# Patient Record
Sex: MALE | Race: WHITE | NOT HISPANIC OR LATINO
[De-identification: names, ages, dates, MRNs, and addresses within clinical notes are randomized per-mention and may not be internally consistent; named-entity substitution may affect disease eponyms.]

---

## 2017-06-12 ENCOUNTER — APPOINTMENT (OUTPATIENT)
Dept: NEUROLOGY | Facility: CLINIC | Age: 27
End: 2017-06-12

## 2017-06-12 VITALS
SYSTOLIC BLOOD PRESSURE: 107 MMHG | WEIGHT: 130 LBS | BODY MASS INDEX: 23.92 KG/M2 | HEART RATE: 67 BPM | DIASTOLIC BLOOD PRESSURE: 69 MMHG | HEIGHT: 62 IN

## 2017-06-12 RX ORDER — CLOTRIMAZOLE AND BETAMETHASONE DIPROPIONATE 10; .5 MG/G; MG/G
1-0.05 CREAM TOPICAL
Qty: 15 | Refills: 0 | Status: ACTIVE | COMMUNITY
Start: 2017-05-23

## 2018-04-14 ENCOUNTER — EMERGENCY (EMERGENCY)
Facility: HOSPITAL | Age: 28
LOS: 1 days | Discharge: HOME | End: 2018-04-14
Attending: PERSONAL EMERGENCY RESPONSE ATTENDANT
Payer: MEDICARE

## 2018-04-14 VITALS
SYSTOLIC BLOOD PRESSURE: 141 MMHG | HEART RATE: 110 BPM | RESPIRATION RATE: 20 BRPM | OXYGEN SATURATION: 98 % | TEMPERATURE: 98 F | DIASTOLIC BLOOD PRESSURE: 93 MMHG

## 2018-04-14 VITALS
DIASTOLIC BLOOD PRESSURE: 86 MMHG | OXYGEN SATURATION: 100 % | RESPIRATION RATE: 16 BRPM | HEART RATE: 100 BPM | SYSTOLIC BLOOD PRESSURE: 131 MMHG

## 2018-04-14 LAB
ALBUMIN SERPL ELPH-MCNC: 4.5 G/DL — SIGNIFICANT CHANGE UP (ref 3.3–5)
ALP SERPL-CCNC: 57 U/L — SIGNIFICANT CHANGE UP (ref 40–120)
ALT FLD-CCNC: 17 U/L RC — SIGNIFICANT CHANGE UP (ref 10–45)
ANION GAP SERPL CALC-SCNC: 15 MMOL/L — SIGNIFICANT CHANGE UP (ref 5–17)
AST SERPL-CCNC: 20 U/L — SIGNIFICANT CHANGE UP (ref 10–40)
BASOPHILS # BLD AUTO: 0.1 K/UL — SIGNIFICANT CHANGE UP (ref 0–0.2)
BASOPHILS NFR BLD AUTO: 1.2 % — SIGNIFICANT CHANGE UP (ref 0–2)
BILIRUB SERPL-MCNC: 0.2 MG/DL — SIGNIFICANT CHANGE UP (ref 0.2–1.2)
BUN SERPL-MCNC: 21 MG/DL — SIGNIFICANT CHANGE UP (ref 7–23)
CALCIUM SERPL-MCNC: 9.7 MG/DL — SIGNIFICANT CHANGE UP (ref 8.4–10.5)
CHLORIDE SERPL-SCNC: 104 MMOL/L — SIGNIFICANT CHANGE UP (ref 96–108)
CO2 SERPL-SCNC: 19 MMOL/L — LOW (ref 22–31)
CREAT SERPL-MCNC: 0.72 MG/DL — SIGNIFICANT CHANGE UP (ref 0.5–1.3)
EOSINOPHIL # BLD AUTO: 0.3 K/UL — SIGNIFICANT CHANGE UP (ref 0–0.5)
EOSINOPHIL NFR BLD AUTO: 3.6 % — SIGNIFICANT CHANGE UP (ref 0–6)
GLUCOSE SERPL-MCNC: 102 MG/DL — HIGH (ref 70–99)
HCT VFR BLD CALC: 41.5 % — SIGNIFICANT CHANGE UP (ref 39–50)
HGB BLD-MCNC: 14.3 G/DL — SIGNIFICANT CHANGE UP (ref 13–17)
LYMPHOCYTES # BLD AUTO: 3 K/UL — SIGNIFICANT CHANGE UP (ref 1–3.3)
LYMPHOCYTES # BLD AUTO: 40 % — SIGNIFICANT CHANGE UP (ref 13–44)
MCHC RBC-ENTMCNC: 32.2 PG — SIGNIFICANT CHANGE UP (ref 27–34)
MCHC RBC-ENTMCNC: 34.5 GM/DL — SIGNIFICANT CHANGE UP (ref 32–36)
MCV RBC AUTO: 93.3 FL — SIGNIFICANT CHANGE UP (ref 80–100)
MONOCYTES # BLD AUTO: 1 K/UL — HIGH (ref 0–0.9)
MONOCYTES NFR BLD AUTO: 13.9 % — SIGNIFICANT CHANGE UP (ref 2–14)
NEUTROPHILS # BLD AUTO: 3.1 K/UL — SIGNIFICANT CHANGE UP (ref 1.8–7.4)
NEUTROPHILS NFR BLD AUTO: 41.3 % — LOW (ref 43–77)
PLATELET # BLD AUTO: 174 K/UL — SIGNIFICANT CHANGE UP (ref 150–400)
POTASSIUM SERPL-MCNC: 5.1 MMOL/L — SIGNIFICANT CHANGE UP (ref 3.5–5.3)
POTASSIUM SERPL-SCNC: 5.1 MMOL/L — SIGNIFICANT CHANGE UP (ref 3.5–5.3)
PROT SERPL-MCNC: 7.9 G/DL — SIGNIFICANT CHANGE UP (ref 6–8.3)
RBC # BLD: 4.45 M/UL — SIGNIFICANT CHANGE UP (ref 4.2–5.8)
RBC # FLD: 11.5 % — SIGNIFICANT CHANGE UP (ref 10.3–14.5)
SODIUM SERPL-SCNC: 138 MMOL/L — SIGNIFICANT CHANGE UP (ref 135–145)
VALPROATE SERPL-MCNC: 85 UG/ML — SIGNIFICANT CHANGE UP (ref 50–100)
WBC # BLD: 7.5 K/UL — SIGNIFICANT CHANGE UP (ref 3.8–10.5)
WBC # FLD AUTO: 7.5 K/UL — SIGNIFICANT CHANGE UP (ref 3.8–10.5)

## 2018-04-14 PROCEDURE — 99284 EMERGENCY DEPT VISIT MOD MDM: CPT | Mod: GC

## 2018-04-14 PROCEDURE — 70450 CT HEAD/BRAIN W/O DYE: CPT | Mod: 26

## 2018-04-14 PROCEDURE — 80053 COMPREHEN METABOLIC PANEL: CPT

## 2018-04-14 PROCEDURE — 85652 RBC SED RATE AUTOMATED: CPT

## 2018-04-14 PROCEDURE — 80164 ASSAY DIPROPYLACETIC ACD TOT: CPT

## 2018-04-14 PROCEDURE — 70450 CT HEAD/BRAIN W/O DYE: CPT

## 2018-04-14 PROCEDURE — 99284 EMERGENCY DEPT VISIT MOD MDM: CPT

## 2018-04-14 PROCEDURE — 85027 COMPLETE CBC AUTOMATED: CPT

## 2018-04-14 NOTE — ED ADULT NURSE NOTE - OBJECTIVE STATEMENT
27y male with hx of anxiety, seizure disorder, and retinopathy prematurity presents to the ER c/o right blurry vision. pt is alert and oriented x 4 and speaking coherently. pt states yesterday his vision became blurry in the afternoon, he follow up with his retina specialist and was d/c home with no significant findings. pt woke up tonight with worsening of the blurry vision. 27y male with hx of anxiety, seizure disorder, and retinopathy prematurity presents to the ER c/o right blurry vision. pt is alert and oriented x 4 and speaking coherently. pt states yesterday his vision became blurry in the afternoon, he follow up with his retina specialist and was d/c home with no significant findings. pt woke up tonight with worsening of the blurry vision. pt denies cp, sob, n/v/d, fevers, chills. pt resting comfortably in bed. pending md espinoza, pt moving all extremities. will reassess.

## 2018-04-14 NOTE — ED PROVIDER NOTE - EYES, MLM
conjunctival injection in right eye, extra ocular movement intact, right visual field of right eye is not detected

## 2018-04-14 NOTE — CONSULT NOTE ADULT - SUBJECTIVE AND OBJECTIVE BOX
[facility] Ophthalmology Consult Note    HPI: 26 yo male presenting with 1 day hx of blurry vision and ?Visual field loss. Patient has history of premature retinopathy.  left eye has slight peripheral vision since birth.  Right eye unknown baseline, however patient and parents say it is normal, patient can generally read without difficulty..  Patient started complaining of acute onset blurriness friday morning.  Saw retina specialist Dr. Pedraza at [facility], was told that everything was unremarkable.  When patient woke up this morning, he couldn't see anything from his right eye.  states that he sees floaters. had cryo surgery at 2 months old.      PMH: Seizures, Intellectual disability, Abdominal sx, ear sx.   Meds: Per chart.   POcHx (including surgeries/lasers/trauma):  Cryotherapy OU for ROP at 2 months of age. Strabismus surgery OU approximately 20 years ago.  Drops: None  FamHx: None  Social Hx: None  Allergies: NKDA    ROS:  General (neg), Vision (per HPI), Head and Neck (neg), Pulm (neg), CV (neg), GI (neg),  (neg), Musculoskeletal (neg), Skin/Integ (neg), Neuro (neg), Endocrine (neg), Heme (neg), All/Immuno (neg)    Mood and Affect Appropriate ( x ),  Oriented to Time, Place, and Person x 3 ( x )    Ophthalmology Exam    Visual acuity (CC) 20/400, 20/800.  Pupils: R+R OU, no APD  Ttono: 9,11   Extraocular movements (EOMs): Full OU, no pain, no diplopia  Confrontational Visual Field (CVF):  Difficult to assess 2/2 to patient cooperativity. Appears nasal defect OD, Full OS  Color Plates: EARL 2/2 to poor vision OU    Slit Lamp Exam (SLE)  Horizontal nystagmus OS>OD.   External:  Flat OU  Lids/Lashes/Lacrimal Ducts: Flat OU    Sclera/Conjunctiva: Trace injection OD. W+Q OS  Cornea: Cl OU  Anterior Chamber: D+Q OU   Iris:  Flat OU  Lens:  Cl OU    Fundus Exam: dilated with 1% tropicamide and 2.5% phenylephrine  Approval obtained from primary team for dilation  Patient aware that pupils can remained dilated for at least 4-6 hours  Exam performed with 20D lens    Vitreous: wnl OU  Disc, cup/disc: sharp and pink, 0.4 OU  Macula:  wnl OU  Vessels:  wnl OU  Periphery: wnl OU    Diagnostic Testing:  CT Brain    No acute intracranial hemorrhage, mass effect, or CT evidence of an acute   vascular territorial infarct.       A/P: Pending Dilated Fundus exam.     Follow-Up:  Patient should follow up his/her ophthalmologist or in the [facility] Ophthalmology Practice within 1 week of discharge  [address]  [place] [address]  [phone] [facility] Ophthalmology Consult Note    HPI: 28 yo male presenting with 1 day hx of blurry vision and ?Visual field loss. Patient has history of premature retinopathy.  left eye has slight peripheral vision since birth.  Right eye unknown baseline, however patient and parents say it is normal, patient can generally read without difficulty..  Patient started complaining of acute onset blurriness friday morning.  Saw retina specialist Dr. Pedraza at [facility] [place], was told that everything was unremarkable.  When patient woke up this morning, he couldn't see anything from his right eye.  states that he sees floaters. had cryo surgery at 2 months old.      PMH: Seizures, Intellectual disability, Abdominal sx, ear sx.   Meds: Per chart.   POcHx (including surgeries/lasers/trauma):  Cryotherapy OU for ROP at 2 months of age. Strabismus surgery OU approximately 20 years ago.  Drops: None  FamHx: None  Social Hx: None  Allergies: NKDA    ROS:  General (neg), Vision (per HPI), Head and Neck (neg), Pulm (neg), CV (neg), GI (neg),  (neg), Musculoskeletal (neg), Skin/Integ (neg), Neuro (neg), Endocrine (neg), Heme (neg), All/Immuno (neg)    Mood and Affect Appropriate ( x ),  Oriented to Time, Place, and Person x 3 ( x )    Ophthalmology Exam    Visual acuity (CC) 20/400, 20/800.  Pupils: R+R OU, no APD  Ttono: 9,11   Extraocular movements (EOMs): Full OU, no pain, no diplopia  Confrontational Visual Field (CVF):  Difficult to assess 2/2 to patient cooperativity. Appears nasal defect OD, Full OS  Color Plates: EARL 2/2 to poor vision OU    Slit Lamp Exam (SLE) (Very limited exam due to patient cooperativity)  Horizontal nystagmus OS>OD.   External:  Flat OU  Lids/Lashes/Lacrimal Ducts: Flat OU    Sclera/Conjunctiva: Trace injection OD. W+Q OS  Cornea: Diffuse SPK OU  Anterior Chamber: D+F, unable to assess if pigment in A/C due to patient cooperativity OU   Iris:  Flat OU  Lens:  Cl OU    Fundus Exam: dilated with 1% tropicamide and 2.5% phenylephrine  Approval obtained from primary team for dilation  Patient aware that pupils can remained dilated for at least 4-6 hours  Exam performed with 20D lens    Vitreous: No view 2/2 to patient cooperativity.  OU  Disc, cup/disc: No view 2/2 to patient cooperativity. OU   Macula:  No view 2/2 to patient cooperativity. OU  Vessels:  No view 2/2 to patient cooperativity. OU  Periphery: No view 2/2 to patient cooperativity.  OU    Diagnostic Testing:  B-Scan: Linear Hyperechoic density in posterior 1/3 of globe, likely detached retina OD. Retina attached, no vitreous hemorrhage OS.     CT Brain  No acute intracranial hemorrhage, mass effect, or CT evidence of an acute   vascular territorial infarct.       A/P: 26 y/o m with history of ROP with Cryotherapy. Acute vision deterioration in right eye from unknown baseline for last 2 days. Difficulty exam due to patient cooperativity, however B-Scan shows evidence or right retinal detachment. Discussed with Retina Attending Dr. Hi. Patient to be re-evaluated at [facility] Monday morning at 08:30, possible plan for Retina reattachment surgery early next week. Patient's family advised to schedule appointment with Primary care provider for Monday early afternoon for possible need for Pre-op clearance evaluation   -No acute Ophthalmologic intervention warrented.         Follow-Up:  Patient should follow up at [facility] Monday morning at 08:30  [address]  [place] [address]  [phone]

## 2018-04-14 NOTE — ED PROVIDER NOTE - OBJECTIVE STATEMENT
28 yo male presenting with 1 day hx of blurry vision.  hx of premature retinopathy.  left eye has slight peripheral vision since birth.  right eye was unremarkable.  patient started complaining of acute onset blurriness friday morning.  saw retina specialist and was told that everything was unremarkable.  when patient woke up this morning, he couldn't see anything from his right eye.  states that he sees floaters. had cryo surgery at 2 months old.    pcp- [facility]  ret[facility]  [facility]

## 2018-04-14 NOTE — ED ADULT NURSE REASSESSMENT NOTE - NS ED NURSE REASSESS COMMENT FT1
Received patient in room 11. Patient states he can barely see through his right eye. Right eye noted to be reddened, patient denies any pain at this time and reports occasional itching. Head CT completed, awaiting optho, family at bedside.

## 2018-04-14 NOTE — ED PROVIDER NOTE - PROGRESS NOTE DETAILS
paged ophtho again Attending MD [place].  Optho reached.  Optho to see in ED and possibly discharge to optho clinic for retinal specialist.  Pt with R lateral upper and lower field of view loss, medial fields of view intact.  Concern for retinal detachment given painless visual loss in R lateral fields of view with floaters.  Optho aware of these concerns.  Pt signed out to incoming team pending optho eval in stable condition. As per ophtho resident Kristian Lala, Ocular US and exam suggests Mac off retinal detachment.  Dr. Lala discussed case with patient's retinal specialist Dr. Hi's group.  Plan for eval at retinal center in less than 48 hours.  Monday 4/16 at 830AM.      KEVIN Esposito MD As per Dr. Lala, change of plan as family would prefer evaluation today.  patient is to be discharged and will proceed directly to retinal clinic to see Dr. Hi in person.      KEVIN Esposito MD

## 2018-04-16 ENCOUNTER — TRANSCRIPTION ENCOUNTER (OUTPATIENT)
Age: 28
End: 2018-04-16

## 2018-04-16 ENCOUNTER — OTHER (OUTPATIENT)
Age: 28
End: 2018-04-16

## 2018-04-16 NOTE — ASU PATIENT PROFILE, ADULT - TEACHING/LEARNING FACTORS IMPACT ABILITY TO LEARN
learning disabilities/visual problems/communication limitations/anxiety/cognitive limitations/comprehension

## 2018-04-16 NOTE — ASU PATIENT PROFILE, ADULT - PMH
ADD (attention deficit disorder)    Anxiety    Depression    Legally blind    OCD (obsessive compulsive disorder)    Scoliosis    Seizure

## 2018-04-16 NOTE — ASU PATIENT PROFILE, ADULT - VISION (WITH CORRECTIVE LENSES IF THE PATIENT USUALLY WEARS THEM):
Left eye blind and right eye blurry/Partially impaired: cannot see medication labels or newsprint, but can see obstacles in path, and the surrounding layout; can count fingers at arm's length

## 2018-04-17 ENCOUNTER — OUTPATIENT (OUTPATIENT)
Dept: OUTPATIENT SERVICES | Facility: HOSPITAL | Age: 28
LOS: 1 days | End: 2018-04-17
Payer: MEDICARE

## 2018-04-17 VITALS
HEART RATE: 79 BPM | HEIGHT: 65 IN | OXYGEN SATURATION: 98 % | TEMPERATURE: 98 F | WEIGHT: 113.32 LBS | SYSTOLIC BLOOD PRESSURE: 136 MMHG | RESPIRATION RATE: 14 BRPM | DIASTOLIC BLOOD PRESSURE: 72 MMHG

## 2018-04-17 VITALS
OXYGEN SATURATION: 98 % | HEART RATE: 100 BPM | RESPIRATION RATE: 20 BRPM | SYSTOLIC BLOOD PRESSURE: 135 MMHG | DIASTOLIC BLOOD PRESSURE: 73 MMHG

## 2018-04-17 DIAGNOSIS — H33.021 RETINAL DETACHMENT WITH MULTIPLE BREAKS, RIGHT EYE: ICD-10-CM

## 2018-04-17 DIAGNOSIS — Z86.69 PERSONAL HISTORY OF OTHER DISEASES OF THE NERVOUS SYSTEM AND SENSE ORGANS: Chronic | ICD-10-CM

## 2018-04-17 DIAGNOSIS — Z98.890 OTHER SPECIFIED POSTPROCEDURAL STATES: Chronic | ICD-10-CM

## 2018-04-17 PROCEDURE — 67108 REPAIR DETACHED RETINA: CPT | Mod: RT

## 2018-04-17 PROCEDURE — C1889: CPT

## 2018-04-17 NOTE — ASU DISCHARGE PLAN (ADULT/PEDIATRIC). - NOTIFY
Pain not relieved by Medications/Bleeding that does not stop/Swelling that continues/Fever greater than 101/Persistent Nausea and Vomiting Swelling that continues/Pain not relieved by Medications/Increased Irritability or Sluggishness/Persistent Nausea and Vomiting/Inability to Tolerate Liquids or Foods/Bleeding that does not stop/Fever greater than 101

## 2018-04-17 NOTE — ASU DISCHARGE PLAN (ADULT/PEDIATRIC). - PT EDUC
Implant card (specify)/other (specify)/Gas bubble card, Eye shield instructions and eye kit given to patient

## 2018-06-04 ENCOUNTER — APPOINTMENT (OUTPATIENT)
Dept: NEUROLOGY | Facility: CLINIC | Age: 28
End: 2018-06-04
Payer: MEDICARE

## 2018-06-04 VITALS
BODY MASS INDEX: 20.14 KG/M2 | HEART RATE: 76 BPM | WEIGHT: 118 LBS | DIASTOLIC BLOOD PRESSURE: 71 MMHG | SYSTOLIC BLOOD PRESSURE: 112 MMHG | HEIGHT: 64 IN

## 2018-06-04 PROBLEM — M41.9 SCOLIOSIS, UNSPECIFIED: Chronic | Status: ACTIVE | Noted: 2018-04-17

## 2018-06-04 PROBLEM — F42.9 OBSESSIVE-COMPULSIVE DISORDER, UNSPECIFIED: Chronic | Status: ACTIVE | Noted: 2018-04-17

## 2018-06-04 PROBLEM — F98.8 OTHER SPECIFIED BEHAVIORAL AND EMOTIONAL DISORDERS WITH ONSET USUALLY OCCURRING IN CHILDHOOD AND ADOLESCENCE: Chronic | Status: ACTIVE | Noted: 2018-04-17

## 2018-06-04 PROBLEM — H54.8 LEGAL BLINDNESS, AS DEFINED IN USA: Chronic | Status: ACTIVE | Noted: 2018-04-17

## 2018-06-04 PROBLEM — F32.9 MAJOR DEPRESSIVE DISORDER, SINGLE EPISODE, UNSPECIFIED: Chronic | Status: ACTIVE | Noted: 2018-04-17

## 2018-06-04 PROBLEM — F41.9 ANXIETY DISORDER, UNSPECIFIED: Chronic | Status: ACTIVE | Noted: 2018-04-17

## 2018-06-04 PROBLEM — R56.9 UNSPECIFIED CONVULSIONS: Chronic | Status: ACTIVE | Noted: 2018-04-17

## 2018-06-04 PROCEDURE — 99214 OFFICE O/P EST MOD 30 MIN: CPT

## 2018-06-04 RX ORDER — SERTRALINE HYDROCHLORIDE 100 MG/1
100 TABLET, FILM COATED ORAL
Qty: 60 | Refills: 0 | Status: ACTIVE | COMMUNITY
Start: 2018-06-04

## 2019-06-03 ENCOUNTER — APPOINTMENT (OUTPATIENT)
Dept: NEUROLOGY | Facility: CLINIC | Age: 29
End: 2019-06-03

## 2019-06-12 ENCOUNTER — APPOINTMENT (OUTPATIENT)
Dept: NEUROLOGY | Facility: CLINIC | Age: 29
End: 2019-06-12
Payer: MEDICARE

## 2019-06-12 VITALS
SYSTOLIC BLOOD PRESSURE: 109 MMHG | BODY MASS INDEX: 21.68 KG/M2 | HEART RATE: 71 BPM | HEIGHT: 64 IN | WEIGHT: 127 LBS | DIASTOLIC BLOOD PRESSURE: 69 MMHG

## 2019-06-12 PROCEDURE — 99214 OFFICE O/P EST MOD 30 MIN: CPT

## 2019-06-12 NOTE — DATA REVIEWED
[No studies available for review at this time.] : No studies available for review at this time. [FreeTextEntry1] : Recent labs normal, with VPA level of 41

## 2019-06-12 NOTE — HISTORY OF PRESENT ILLNESS
[FreeTextEntry1] : **UPDATE:6/12/19***\par Patient accompanied by father\par doing well with no reported seizures\par Zoloft is helping for anxiety\par mood good\par \par \par Divalproex 750mg daily (prescribed by psychiatry)\par \par \par The patient is a 28 year old right handed man with history as below.  He has had no seizures since last visit.  He states his mood is good.  He is on Depakote 750mg ER daily with last increase by Dr. White for irritable behavior, and it has helped. He is in psychotherapy.  \par \par He has had no hospitalizations, ER visits, surgeries or injuries since last visit.  He has no complaints on complete ROS.  He is sleeping quite a bit but stays up very late, eating well, and mood is good.  He reports no side effects from meds.  HE reports no new illnesses in the family.  \par \par Past history: The patient had the onset  of seizures shortly after a camping trip in February 2004 and one on May 16th, 2004 and started on Depakote at that time.  The EEGs have been abnormal with interictal epileptiform patterns. He originally followed with Dr. Schaffer and then with Dr. Rosenberg.\par \par He also has depression, anxiety, ADHD.  He has developmental disability and has always been in special courses. \par   \par He was born one of triplets at 26 weeks, 2 pounds 10 Oz at [facility].  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  Continues to do well at this time.  One short seizure in June 2016 with sleep deprivation.\par \par Now, had retinal detachment last month that was repaired and increased anxiety.  Increased to Zoloft 100mg bid.  Still on Depakote (brand name).\par \par No change PMHx, SocHx, FHx\par \par

## 2019-06-12 NOTE — LETTER CLOSING
[Sincerely,] : Sincerely, [Chief, Division of Epilepsy and Electroencephalography] : Chief, Division of Epilepsy and Electroencephalography [Tea Rosenberg MD] : Tea Rosenberg MD [[facility]] : [facility] [[facility]] : [facility] [Professor of Neurology] : Professor of Neurology [[facility]] : [facility]

## 2019-06-12 NOTE — PHYSICAL EXAM
[General Appearance - Alert] : alert [General Appearance - In No Acute Distress] : in no acute distress [Oriented To Time, Place, And Person] : oriented to person, place, and time [Impaired Insight] : insight and judgment were intact [Affect] : the affect was normal [Place] : oriented to place [Time] : oriented to time [Person] : oriented to person [Cranial Nerves Optic (II)] : visual acuity intact bilaterally,  visual fields full to confrontation, pupils equal round and reactive to light [Fluency] : fluency intact [Cranial Nerves Oculomotor (III)] : extraocular motion intact [Cranial Nerves Vestibulocochlear (VIII)] : hearing was intact bilaterally [Cranial Nerves Trigeminal (V)] : facial sensation intact symmetrically [Cranial Nerves Facial (VII)] : face symmetrical [Cranial Nerves Glossopharyngeal (IX)] : tongue and palate midline [Cranial Nerves Accessory (XI - Cranial And Spinal)] : head turning and shoulder shrug symmetric [Cranial Nerves Hypoglossal (XII)] : there was no tongue deviation with protrusion [No Muscle Atrophy] : normal bulk in all four extremities [Sensation Tactile Decrease] : light touch was intact [Motor Strength] : muscle strength was normal in all four extremities [Balance] : balance was intact [2+] : Ankle jerk left 2+ [Sclera] : the sclera and conjunctiva were normal [PERRL With Normal Accommodation] : pupils were equal in size, round, reactive to light, with normal accommodation [Extraocular Movements] : extraocular movements were intact [Edema] : there was no peripheral edema [Full Pulse] : the pedal pulses are present [Nail Clubbing] : no clubbing  or cyanosis of the fingernails [Abnormal Walk] : normal gait [Musculoskeletal - Swelling] : no joint swelling seen [Motor Tone] : muscle strength and tone were normal [Past-pointing] : there was no past-pointing [Tremor] : no tremor present [Plantar Reflex Right Only] : normal on the right [Plantar Reflex Left Only] : normal on the left

## 2019-06-12 NOTE — DISCUSSION/SUMMARY
[FreeTextEntry1] : Patient with stable seizures associated with developmental delay and is being treated for  ADHD and anxiety with increased Valproate (BRAND - REFILLED BY DR WHITE).  \par \par \par Plan:\par continue current AED regimen\par Doing well with Dr. White at [facility] now seeing him once per month. (prescribed Depakote)\par annual labs forward recent)\par Bone density Dexa scan (long term VPA r/o osteopenia)\par encouraged 3 serings Ca+ in daily diet/low weight bearing exercises\par reviewed seizure triggers\par follow up in one year\par \par \par Will f/u in 12 months.\par \par Patient seen for 25 min face to face with >50% in counseling and discussion.\par \par

## 2020-05-29 ENCOUNTER — APPOINTMENT (OUTPATIENT)
Dept: NEUROLOGY | Facility: CLINIC | Age: 30
End: 2020-05-29

## 2020-06-05 ENCOUNTER — EMERGENCY (EMERGENCY)
Facility: HOSPITAL | Age: 30
LOS: 1 days | Discharge: HOME | End: 2020-06-05
Attending: EMERGENCY MEDICINE | Admitting: EMERGENCY MEDICINE
Payer: MEDICARE

## 2020-06-05 VITALS
SYSTOLIC BLOOD PRESSURE: 112 MMHG | TEMPERATURE: 98 F | DIASTOLIC BLOOD PRESSURE: 76 MMHG | HEART RATE: 73 BPM | OXYGEN SATURATION: 100 % | RESPIRATION RATE: 16 BRPM

## 2020-06-05 VITALS
OXYGEN SATURATION: 100 % | DIASTOLIC BLOOD PRESSURE: 69 MMHG | TEMPERATURE: 97 F | RESPIRATION RATE: 16 BRPM | HEART RATE: 75 BPM | SYSTOLIC BLOOD PRESSURE: 107 MMHG

## 2020-06-05 DIAGNOSIS — Z98.890 OTHER SPECIFIED POSTPROCEDURAL STATES: Chronic | ICD-10-CM

## 2020-06-05 DIAGNOSIS — Z86.69 PERSONAL HISTORY OF OTHER DISEASES OF THE NERVOUS SYSTEM AND SENSE ORGANS: Chronic | ICD-10-CM

## 2020-06-05 LAB
ALBUMIN SERPL ELPH-MCNC: 4.9 G/DL — SIGNIFICANT CHANGE UP (ref 3.3–5)
ALP SERPL-CCNC: 44 U/L — SIGNIFICANT CHANGE UP (ref 40–120)
ALT FLD-CCNC: 9 U/L — SIGNIFICANT CHANGE UP (ref 4–41)
AMMONIA BLD-MCNC: 46 UMOL/L — SIGNIFICANT CHANGE UP (ref 11–55)
ANION GAP SERPL CALC-SCNC: 11 MMO/L — SIGNIFICANT CHANGE UP (ref 7–14)
AST SERPL-CCNC: 12 U/L — SIGNIFICANT CHANGE UP (ref 4–40)
BASOPHILS # BLD AUTO: 0.04 K/UL — SIGNIFICANT CHANGE UP (ref 0–0.2)
BASOPHILS NFR BLD AUTO: 0.6 % — SIGNIFICANT CHANGE UP (ref 0–2)
BILIRUB SERPL-MCNC: 0.3 MG/DL — SIGNIFICANT CHANGE UP (ref 0.2–1.2)
BUN SERPL-MCNC: 17 MG/DL — SIGNIFICANT CHANGE UP (ref 7–23)
CALCIUM SERPL-MCNC: 10.5 MG/DL — SIGNIFICANT CHANGE UP (ref 8.4–10.5)
CHLORIDE SERPL-SCNC: 104 MMOL/L — SIGNIFICANT CHANGE UP (ref 98–107)
CO2 SERPL-SCNC: 28 MMOL/L — SIGNIFICANT CHANGE UP (ref 22–31)
CREAT SERPL-MCNC: 0.67 MG/DL — SIGNIFICANT CHANGE UP (ref 0.5–1.3)
EOSINOPHIL # BLD AUTO: 0.29 K/UL — SIGNIFICANT CHANGE UP (ref 0–0.5)
EOSINOPHIL NFR BLD AUTO: 4.6 % — SIGNIFICANT CHANGE UP (ref 0–6)
GLUCOSE SERPL-MCNC: 73 MG/DL — SIGNIFICANT CHANGE UP (ref 70–99)
HCT VFR BLD CALC: 43.2 % — SIGNIFICANT CHANGE UP (ref 39–50)
HGB BLD-MCNC: 13.9 G/DL — SIGNIFICANT CHANGE UP (ref 13–17)
IMM GRANULOCYTES NFR BLD AUTO: 0.6 % — SIGNIFICANT CHANGE UP (ref 0–1.5)
LIDOCAIN IGE QN: 21.4 U/L — SIGNIFICANT CHANGE UP (ref 7–60)
LYMPHOCYTES # BLD AUTO: 2.38 K/UL — SIGNIFICANT CHANGE UP (ref 1–3.3)
LYMPHOCYTES # BLD AUTO: 37.5 % — SIGNIFICANT CHANGE UP (ref 13–44)
MCHC RBC-ENTMCNC: 29.5 PG — SIGNIFICANT CHANGE UP (ref 27–34)
MCHC RBC-ENTMCNC: 32.2 % — SIGNIFICANT CHANGE UP (ref 32–36)
MCV RBC AUTO: 91.7 FL — SIGNIFICANT CHANGE UP (ref 80–100)
MONOCYTES # BLD AUTO: 0.91 K/UL — HIGH (ref 0–0.9)
MONOCYTES NFR BLD AUTO: 14.3 % — HIGH (ref 2–14)
NEUTROPHILS # BLD AUTO: 2.69 K/UL — SIGNIFICANT CHANGE UP (ref 1.8–7.4)
NEUTROPHILS NFR BLD AUTO: 42.4 % — LOW (ref 43–77)
NRBC # FLD: 0 K/UL — SIGNIFICANT CHANGE UP (ref 0–0)
PLATELET # BLD AUTO: 193 K/UL — SIGNIFICANT CHANGE UP (ref 150–400)
PMV BLD: 11.6 FL — SIGNIFICANT CHANGE UP (ref 7–13)
POTASSIUM SERPL-MCNC: 4.1 MMOL/L — SIGNIFICANT CHANGE UP (ref 3.5–5.3)
POTASSIUM SERPL-SCNC: 4.1 MMOL/L — SIGNIFICANT CHANGE UP (ref 3.5–5.3)
PROT SERPL-MCNC: 7.4 G/DL — SIGNIFICANT CHANGE UP (ref 6–8.3)
RBC # BLD: 4.71 M/UL — SIGNIFICANT CHANGE UP (ref 4.2–5.8)
RBC # FLD: 12.3 % — SIGNIFICANT CHANGE UP (ref 10.3–14.5)
SODIUM SERPL-SCNC: 143 MMOL/L — SIGNIFICANT CHANGE UP (ref 135–145)
VALPROATE SERPL-MCNC: 74.2 UG/ML — SIGNIFICANT CHANGE UP (ref 50–100)
WBC # BLD: 6.35 K/UL — SIGNIFICANT CHANGE UP (ref 3.8–10.5)
WBC # FLD AUTO: 6.35 K/UL — SIGNIFICANT CHANGE UP (ref 3.8–10.5)

## 2020-06-05 PROCEDURE — 71046 X-RAY EXAM CHEST 2 VIEWS: CPT | Mod: 26

## 2020-06-05 PROCEDURE — 99284 EMERGENCY DEPT VISIT MOD MDM: CPT

## 2020-06-05 RX ADMIN — Medication 30 MILLILITER(S): at 11:12

## 2020-06-05 NOTE — ED PROVIDER NOTE - PATIENT PORTAL LINK FT
You can access the FollowMyHealth Patient Portal offered by [facility] by registering at the following website: [email or web address]. By joining [facility]’s FollowMyHealth portal, you will also be able to view your health information using other applications (apps) compatible with our system.

## 2020-06-05 NOTE — ED PROVIDER NOTE - MUSCULOSKELETAL, MLM
Spine appears normal, range of motion is not limited, no muscle or joint tenderness. No CVA tenderness. No edema and good pulses.

## 2020-06-05 NOTE — ED ADULT TRIAGE NOTE - CHIEF COMPLAINT QUOTE
Pt c/o CP and abd pain x1 month.  Denies N/V.  Says he normally has diarrhea.  Father states pt has OCD and ADHD and can't be left in hospital alone.

## 2020-06-05 NOTE — ED PROVIDER NOTE - HISTORY ATTESTATION, MLM
Pt to lab area. Port accessed with power port needle and labs drawn. Pt needed to recline to obtain good blood return. Port capped and flushed with saline.  Pt discharged and going to have her CT done and then return for needle removal.   1220 Pt returned f I have reviewed and confirmed nurses' notes...

## 2020-06-05 NOTE — ED PROVIDER NOTE - OBJECTIVE STATEMENT
30 y/o M with PMHx of developmental delay and seizure disorder (presently taking Depakote and Zoloft) presents to ED with chest pain and abdominal pain. Patient has been complaining to parents stating he has chest pain and abdominal pain within the last few days. As per parents, patient has had this pain on and off for several weeks. Pt with history of similar complaints years ago and symptoms were improved with Pepto Bismol. Per parents, patient with no change in appetite, no fever, no chills, no exposure to sick contacts, and no N/V/D. Today patient complained of increased pain and parents called PMD who advised ED visit for further evaluation. This morning patient ate normal breakfast and on route to ED stated the pain had resolved. At the time of exam patient complaining of mild pain and point to mid abdomen/mid chest. Denies having any pleuritic pain, SOB, cough, or other medical complaints.

## 2020-06-05 NOTE — ED ADULT NURSE NOTE - NSIMPLEMENTINTERV_GEN_ALL_ED
Implemented All Universal Safety Interventions:  [place] to call system. Call bell, personal items and telephone within reach. Instruct patient to call for assistance. Room bathroom lighting operational. Non-slip footwear when patient is off stretcher. Physically safe environment: no spills, clutter or unnecessary equipment. Stretcher in lowest position, wheels locked, appropriate side rails in place.

## 2020-06-05 NOTE — ED PROVIDER NOTE - NSFOLLOWUPINSTRUCTIONS_ED_ALL_ED_FT
Your bloodwork, EKG and Chest Xray did not show an acute problem.  Your Depakote level was normal.  You may use Maalox as needed when having abdominal pain.  Pepcid 2x per day for 1 wk.  Follow up with your doctor or return for worsening pain, fever, nausea, vomiting, weakness, bleeding or any signs of distress.

## 2020-06-05 NOTE — ED PROVIDER NOTE - CLINICAL SUMMARY MEDICAL DECISION MAKING FREE TEXT BOX
30 y/o M presents to ED with mild tenderness in epigastrium and periumbilical, episodic pain, and normal PO intake over the past weeks including today. Unlikely significant GI infection. Plan to check labs, LFT, Depakote level, EKG, CXR, and reassess.

## 2020-09-29 ENCOUNTER — APPOINTMENT (OUTPATIENT)
Dept: NEUROLOGY | Facility: CLINIC | Age: 30
End: 2020-09-29
Payer: MEDICARE

## 2020-09-29 VITALS — SYSTOLIC BLOOD PRESSURE: 119 MMHG | HEART RATE: 87 BPM | DIASTOLIC BLOOD PRESSURE: 73 MMHG

## 2020-09-29 PROCEDURE — 99214 OFFICE O/P EST MOD 30 MIN: CPT

## 2020-09-29 NOTE — PHYSICAL EXAM
[General Appearance - Alert] : alert [General Appearance - In No Acute Distress] : in no acute distress [Oriented To Time, Place, And Person] : oriented to person, place, and time [Impaired Insight] : insight and judgment were intact [Affect] : the affect was normal [Person] : oriented to person [Place] : oriented to place [Time] : oriented to time [Fluency] : fluency intact [Cranial Nerves Optic (II)] : visual acuity intact bilaterally,  visual fields full to confrontation, pupils equal round and reactive to light [Cranial Nerves Oculomotor (III)] : extraocular motion intact [Cranial Nerves Trigeminal (V)] : facial sensation intact symmetrically [Cranial Nerves Facial (VII)] : face symmetrical [Cranial Nerves Vestibulocochlear (VIII)] : hearing was intact bilaterally [Cranial Nerves Glossopharyngeal (IX)] : tongue and palate midline [Cranial Nerves Accessory (XI - Cranial And Spinal)] : head turning and shoulder shrug symmetric [Cranial Nerves Hypoglossal (XII)] : there was no tongue deviation with protrusion [Motor Strength] : muscle strength was normal in all four extremities [No Muscle Atrophy] : normal bulk in all four extremities [Sensation Tactile Decrease] : light touch was intact [Balance] : balance was intact [Past-pointing] : there was no past-pointing [Tremor] : no tremor present [2+] : Ankle jerk left 2+ [Plantar Reflex Right Only] : normal on the right [Plantar Reflex Left Only] : normal on the left [Sclera] : the sclera and conjunctiva were normal [PERRL With Normal Accommodation] : pupils were equal in size, round, reactive to light, with normal accommodation [Extraocular Movements] : extraocular movements were intact [Full Pulse] : the pedal pulses are present [Edema] : there was no peripheral edema [Abnormal Walk] : normal gait [Nail Clubbing] : no clubbing  or cyanosis of the fingernails [Musculoskeletal - Swelling] : no joint swelling seen [Motor Tone] : muscle strength and tone were normal

## 2020-09-29 NOTE — DISCUSSION/SUMMARY
[FreeTextEntry1] : Patient with stable seizures associated with developmental delay and is being treated for  ADHD and anxiety with increased Valproate (BRAND - REFILLED BY DR WHITE).  \par \par \par Plan:\par continue current AED regimen\par Doing well with Dr. White at [facility] now seeing him once per month. (prescribed Depakote)\par annual labs forward recent)\par Bone density Dexa scan (long term VPA r/o osteopenia)\par encouraged 3 serings Ca+ in daily diet/low weight bearing exercises\par reviewed seizure triggers\par follow up in one year\par \par \par Will f/u in 12 months.\par \par Patient seen for 25 min face to face with >50% in counseling and discussion.\par \par

## 2020-09-29 NOTE — HISTORY OF PRESENT ILLNESS
[FreeTextEntry1] : **UPDATE:9/29/2020***\par Patient accompanied by father\par doing well with no reported seizures\par Zoloft is helping for anxiety\par mood good\par \par \par **UPDATE:6/12/19***\par Patient accompanied by father\par doing well with no reported seizures\par Zoloft is helping for anxiety\par mood good\par \par \par Divalproex 750mg daily (prescribed by psychiatry)\par \par \par The patient is a 28 year old right handed man with history as below.  He has had no seizures since last visit.  He states his mood is good.  He is on Depakote 750mg ER daily with last increase by Dr. White for irritable behavior, and it has helped. He is in psychotherapy.  \par \par He has had no hospitalizations, ER visits, surgeries or injuries since last visit.  He has no complaints on complete ROS.  He is sleeping quite a bit but stays up very late, eating well, and mood is good.  He reports no side effects from meds.  HE reports no new illnesses in the family.  \par \par Past history: The patient had the onset  of seizures shortly after a camping trip in February 2004 and one on May 16th, 2004 and started on Depakote at that time.  The EEGs have been abnormal with interictal epileptiform patterns. He originally followed with Dr. Schaffer and then with Dr. Rosenberg.\par \par He also has depression, anxiety, ADHD.  He has developmental disability and has always been in special courses. \par   \par He was born one of triplets at 26 weeks, 2 pounds 10 Oz at [facility].  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  Continues to do well at this time.  One short seizure in June 2016 with sleep deprivation.\par \par Now, had retinal detachment last month that was repaired and increased anxiety.  Increased to Zoloft 100mg bid.  Still on Depakote (brand name).\par \par No change PMHx, SocHx, FHx\par \par

## 2020-09-29 NOTE — LETTER CLOSING
[Sincerely,] : Sincerely, [Tea Rosenberg MD] : Tea Rosenberg MD [Chief, Division of Epilepsy and Electroencephalography] : Chief, Division of Epilepsy and Electroencephalography [[facility]] : [facility] [[facility]] : [facility] [Professor of Neurology] : Professor of Neurology [[facility]] : [facility]

## 2021-06-04 ENCOUNTER — APPOINTMENT (OUTPATIENT)
Dept: NEUROLOGY | Facility: CLINIC | Age: 31
End: 2021-06-04
Payer: MEDICARE

## 2021-06-04 VITALS
BODY MASS INDEX: 23.39 KG/M2 | SYSTOLIC BLOOD PRESSURE: 123 MMHG | WEIGHT: 137 LBS | HEART RATE: 79 BPM | DIASTOLIC BLOOD PRESSURE: 79 MMHG | HEIGHT: 64 IN

## 2021-06-04 PROCEDURE — 99214 OFFICE O/P EST MOD 30 MIN: CPT

## 2021-06-04 NOTE — DISCUSSION/SUMMARY
[FreeTextEntry1] : Patient with stable seizures associated with developmental delay and is being treated for  ADHD and anxiety with increased Valproate (BRAND - REFILLED BY DR WHITE).  \par \par \par Plan:\par continue current AED regimen\par Doing well with Dr. White at [facility] now seeing him once per month. (prescribed Depakote)\par annual labs forward recent)\par Bone density Dexa scan (long term VPA r/o osteopenia)\par encouraged 3 serings Ca+ in daily diet/low weight bearing exercises\par reviewed seizure triggers\par follow up in one year\par \par \par Will f/u in 12 months.\par \par Total time 31 min.\par \par

## 2021-06-04 NOTE — PHYSICAL EXAM
[General Appearance - Alert] : alert [General Appearance - In No Acute Distress] : in no acute distress [Oriented To Time, Place, And Person] : oriented to person, place, and time [Impaired Insight] : insight and judgment were intact [Affect] : the affect was normal [Person] : oriented to person [Place] : oriented to place [Time] : oriented to time [Fluency] : fluency intact [Cranial Nerves Optic (II)] : visual acuity intact bilaterally,  visual fields full to confrontation, pupils equal round and reactive to light [Cranial Nerves Oculomotor (III)] : extraocular motion intact [Cranial Nerves Trigeminal (V)] : facial sensation intact symmetrically [Cranial Nerves Facial (VII)] : face symmetrical [Cranial Nerves Vestibulocochlear (VIII)] : hearing was intact bilaterally [Cranial Nerves Glossopharyngeal (IX)] : tongue and palate midline [Cranial Nerves Accessory (XI - Cranial And Spinal)] : head turning and shoulder shrug symmetric [Cranial Nerves Hypoglossal (XII)] : there was no tongue deviation with protrusion [Motor Strength] : muscle strength was normal in all four extremities [No Muscle Atrophy] : normal bulk in all four extremities [Sensation Tactile Decrease] : light touch was intact [Balance] : balance was intact [Past-pointing] : there was no past-pointing [Tremor] : no tremor present [2+] : Ankle jerk left 2+ [Plantar Reflex Right Only] : normal on the right [Plantar Reflex Left Only] : normal on the left [Sclera] : the sclera and conjunctiva were normal [PERRL With Normal Accommodation] : pupils were equal in size, round, reactive to light, with normal accommodation [Extraocular Movements] : extraocular movements were intact [Full Pulse] : the pedal pulses are present [Edema] : there was no peripheral edema [Abnormal Walk] : normal gait [Nail Clubbing] : no clubbing  or cyanosis of the fingernails [Motor Tone] : muscle strength and tone were normal [Musculoskeletal - Swelling] : no joint swelling seen

## 2021-06-04 NOTE — HISTORY OF PRESENT ILLNESS
[FreeTextEntry1] : **UPDATE:6/4/2021***\par Patient accompanied by father\par doing well with no reported seizures\par Zoloft is helping for anxiety\par mood good\par \par **UPDATE:9/29/2020***\par Patient accompanied by father\par doing well with no reported seizures\par Zoloft is helping for anxiety\par mood good\par \par \par **UPDATE:6/12/19***\par Patient accompanied by father\par doing well with no reported seizures\par Zoloft is helping for anxiety\par mood good\par \par \par Divalproex 750mg daily (prescribed by psychiatry)\par \par \par The patient is a 28 year old right handed man with history as below.  He has had no seizures since last visit.  He states his mood is good.  He is on Depakote 750mg ER daily with last increase by Dr. White for irritable behavior, and it has helped. He is in psychotherapy.  \par \par He has had no hospitalizations, ER visits, surgeries or injuries since last visit.  He has no complaints on complete ROS.  He is sleeping quite a bit but stays up very late, eating well, and mood is good.  He reports no side effects from meds.  HE reports no new illnesses in the family.  \par \par Past history: The patient had the onset  of seizures shortly after a camping trip in February 2004 and one on May 16th, 2004 and started on Depakote at that time.  The EEGs have been abnormal with interictal epileptiform patterns. He originally followed with Dr. Schaffer and then with Dr. Rosenberg.\par \par He also has depression, anxiety, ADHD.  He has developmental disability and has always been in special courses. \par   \par He was born one of triplets at 26 weeks, 2 pounds 10 Oz at [facility].  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  Continues to do well at this time.  One short seizure in June 2016 with sleep deprivation.\par \par Now, had retinal detachment last month that was repaired and increased anxiety.  Increased to Zoloft 100mg bid.  Still on Depakote (brand name).\par \par No change PMHx, SocHx, FHx\par \par

## 2021-06-04 NOTE — LETTER CLOSING
[Sincerely,] : Sincerely, [Tea Rosenberg MD] : Tea Rosenberg MD [Chief, Division of Epilepsy and Electroencephalography] : Chief, Division of Epilepsy and Electroencephalography [[facility]] : [facility] [[facility]] : [facility] [Professor of Neurology] : Professor of Neurology [[facility]] : [facility]

## 2022-04-13 NOTE — ASU PREOP CHECKLIST - SITE MARKED BY SURGEON
Confirm with patient:  Phone: [phone]  Date of surgery: 4.22.2022  Laterality: right  PCP: Leonardo  Ins: [facility]    Postop: 4.28 @ 10.15  COVID Instructions given/appt made: Discussed 4.19 @ 12pmHermelinda    Verbally confirmed no aspirin or anti-inflammatory meds 3 days: discussed  Tylenol is OK for pain:  discussed  Weight loss medications for 10 days prior to surgery: discussed    NPO after midnight night before surgery: discussed  ride home if Out Patient and anesthesia: discussed  Discuss DME from [facility] with patient:discussed        Case Request: done    Service to Physical Therapy for Pre Hab: done  Send DME order to [facility] (co-signature required before send - done  Surg in [place]: done  Conf letter mailed on: 4.13.2022      Note:  Dr. Strong will agree to complete H&P for patients who are healthy (will state on order).  o If pre-op EKG/labs are required due to anesthesia guidelines (see above PreOp Section):  - Send message to RN pool for orders needed  - Advise patient to call to schedule labs / EKG with our centralized scheduling team at [phone]     yes

## 2022-06-13 ENCOUNTER — APPOINTMENT (OUTPATIENT)
Dept: NEUROLOGY | Facility: CLINIC | Age: 32
End: 2022-06-13
Payer: MEDICARE

## 2022-06-13 VITALS
HEIGHT: 64 IN | BODY MASS INDEX: 21.85 KG/M2 | SYSTOLIC BLOOD PRESSURE: 119 MMHG | DIASTOLIC BLOOD PRESSURE: 74 MMHG | HEART RATE: 68 BPM | WEIGHT: 128 LBS

## 2022-06-13 PROCEDURE — 99214 OFFICE O/P EST MOD 30 MIN: CPT

## 2022-06-13 NOTE — HISTORY OF PRESENT ILLNESS
[FreeTextEntry1] : **UPDATE:6/13/2022***\par Patient accompanied by father\par doing well with no reported seizures\par Zoloft is helping for anxiety\par mood good\par \par **UPDATE:6/4/2021***\par Patient accompanied by father\par doing well with no reported seizures\par Zoloft is helping for anxiety\par mood good\par \par **UPDATE:9/29/2020***\par Patient accompanied by father\par doing well with no reported seizures\par Zoloft is helping for anxiety\par mood good\par \par \par **UPDATE:6/12/19***\par Patient accompanied by father\par doing well with no reported seizures\par Zoloft is helping for anxiety\par mood good\par \par \par Divalproex 750mg daily (prescribed by psychiatry)\par \par \par The patient is a 28 year old right handed man with history as below.  He has had no seizures since last visit.  He states his mood is good.  He is on Depakote 750mg ER daily with last increase by Dr. White for irritable behavior, and it has helped. He is in psychotherapy.  \par \par He has had no hospitalizations, ER visits, surgeries or injuries since last visit.  He has no complaints on complete ROS.  He is sleeping quite a bit but stays up very late, eating well, and mood is good.  He reports no side effects from meds.  HE reports no new illnesses in the family.  \par \par Past history: The patient had the onset  of seizures shortly after a camping trip in February 2004 and one on May 16th, 2004 and started on Depakote at that time.  The EEGs have been abnormal with interictal epileptiform patterns. He originally followed with Dr. Schaffer and then with Dr. Rosenberg.\par \par He also has depression, anxiety, ADHD.  He has developmental disability and has always been in special courses. \par   \par He was born one of triplets at 26 weeks, 2 pounds 10 Oz at [facility].  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  Continues to do well at this time.  One short seizure in June 2016 with sleep deprivation.\par \par Now, had retinal detachment last month that was repaired and increased anxiety.  Increased to Zoloft 100mg bid.  Still on Depakote (brand name).\par \par No change PMHx, SocHx, FHx\par \par

## 2022-06-13 NOTE — DISCUSSION/SUMMARY
[FreeTextEntry1] : Patient with stable seizures associated with developmental delay and is being treated for  ADHD and anxiety with increased Valproate (BRAND - REFILLED BY DR WHITE).  \par \par \par Plan:\par continue current AED regimen\par Doing well with Dr. White at [facility] now seeing him once per month. (prescribed Depakote)\par annual labs forward recent)\par Bone density Dexa scan (long term VPA r/o osteopenia)\par encouraged 3 serings Ca+ in daily diet/low weight bearing exercises\par reviewed seizure triggers\par follow up in one year\par \par \par Will f/u in 12 months.\par \par Total time 31 min.\par \par

## 2022-06-13 NOTE — LETTER CLOSING
[Sincerely,] : Sincerely, [Tea Rosenberg MD] : Tea Rosenberg MD [Chief, Division of Epilepsy and Electroencephalography] : Chief, Division of Epilepsy and Electroencephalography [[facility]] : [facility] [[facility]] : [facility] [Professor of Neurology] : Professor of Neurology [[facility]] : [facility]

## 2023-06-14 ENCOUNTER — APPOINTMENT (OUTPATIENT)
Dept: NEUROLOGY | Facility: CLINIC | Age: 33
End: 2023-06-14

## 2023-07-24 ENCOUNTER — NON-APPOINTMENT (OUTPATIENT)
Age: 33
End: 2023-07-24

## 2023-07-26 NOTE — LETTER CLOSING
[Sincerely,] : Sincerely, [Tea Rosenberg MD] : Tea Rosenberg MD [Chief, Division of Epilepsy and Electroencephalography] : Chief, Division of Epilepsy and Electroencephalography [[facility]] : [facility] [[facility]] : [facility] [Professor of Neurology] : Professor of Neurology [[facility]] : [facility]

## 2023-07-28 ENCOUNTER — APPOINTMENT (OUTPATIENT)
Dept: NEUROLOGY | Facility: CLINIC | Age: 33
End: 2023-07-28
Payer: MEDICARE

## 2023-07-28 VITALS
HEIGHT: 64 IN | SYSTOLIC BLOOD PRESSURE: 119 MMHG | DIASTOLIC BLOOD PRESSURE: 75 MMHG | BODY MASS INDEX: 23.39 KG/M2 | HEART RATE: 67 BPM | WEIGHT: 137 LBS

## 2023-07-28 PROCEDURE — 99214 OFFICE O/P EST MOD 30 MIN: CPT

## 2023-07-28 NOTE — PHYSICAL EXAM
[General Appearance - Alert] : alert [General Appearance - In No Acute Distress] : in no acute distress [Oriented To Time, Place, And Person] : oriented to person, place, and time [Impaired Insight] : insight and judgment were intact [Affect] : the affect was normal [Person] : oriented to person [Place] : oriented to place [Time] : oriented to time [Fluency] : fluency intact [Cranial Nerves Optic (II)] : visual acuity intact bilaterally,  visual fields full to confrontation, pupils equal round and reactive to light [Cranial Nerves Oculomotor (III)] : extraocular motion intact [Cranial Nerves Trigeminal (V)] : facial sensation intact symmetrically [Cranial Nerves Facial (VII)] : face symmetrical [Cranial Nerves Vestibulocochlear (VIII)] : hearing was intact bilaterally [Cranial Nerves Glossopharyngeal (IX)] : tongue and palate midline [Cranial Nerves Accessory (XI - Cranial And Spinal)] : head turning and shoulder shrug symmetric [Cranial Nerves Hypoglossal (XII)] : there was no tongue deviation with protrusion [Motor Strength] : muscle strength was normal in all four extremities [No Muscle Atrophy] : normal bulk in all four extremities [Sensation Tactile Decrease] : light touch was intact [Balance] : balance was intact [2+] : Ankle jerk left 2+ [Sclera] : the sclera and conjunctiva were normal [PERRL With Normal Accommodation] : pupils were equal in size, round, reactive to light, with normal accommodation [Extraocular Movements] : extraocular movements were intact [Full Pulse] : the pedal pulses are present [Edema] : there was no peripheral edema [Abnormal Walk] : normal gait [Nail Clubbing] : no clubbing  or cyanosis of the fingernails [Musculoskeletal - Swelling] : no joint swelling seen [Motor Tone] : muscle strength and tone were normal [Past-pointing] : there was no past-pointing [Tremor] : no tremor present [Plantar Reflex Right Only] : normal on the right [Plantar Reflex Left Only] : normal on the left

## 2023-07-28 NOTE — HISTORY OF PRESENT ILLNESS
[FreeTextEntry1] : **UPDATE:7/28/2023***\par Patient accompanied by father\par doing well with no reported seizures\par Zoloft is helping for anxiety\par mood good\par \par **UPDATE:6/4/2021***\par Patient accompanied by father\par doing well with no reported seizures\par Zoloft is helping for anxiety\par mood good\par \par **UPDATE:9/29/2020***\par Patient accompanied by father\par doing well with no reported seizures\par Zoloft is helping for anxiety\par mood good\par \par \par **UPDATE:6/12/19***\par Patient accompanied by father\par doing well with no reported seizures\par Zoloft is helping for anxiety\par mood good\par \par \par Divalproex 750mg daily (prescribed by psychiatry)\par \par \par The patient is a 28 year old right handed man with history as below.  He has had no seizures since last visit.  He states his mood is good.  He is on Depakote 750mg ER daily with last increase by Dr. White for irritable behavior, and it has helped. He is in psychotherapy.  \par \par He has had no hospitalizations, ER visits, surgeries or injuries since last visit.  He has no complaints on complete ROS.  He is sleeping quite a bit but stays up very late, eating well, and mood is good.  He reports no side effects from meds.  HE reports no new illnesses in the family.  \par \par Past history: The patient had the onset  of seizures shortly after a camping trip in February 2004 and one on May 16th, 2004 and started on Depakote at that time.  The EEGs have been abnormal with interictal epileptiform patterns. He originally followed with Dr. Schaffer and then with Dr. Rosenberg.\par \par He also has depression, anxiety, ADHD.  He has developmental disability and has always been in special courses. \par   \par He was born one of triplets at 26 weeks, 2 pounds 10 Oz at [facility].  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  Continues to do well at this time.  One short seizure in June 2016 with sleep deprivation.\par \par Now, had retinal detachment last month that was repaired and increased anxiety.  Increased to Zoloft 100mg bid.  Still on Depakote (brand name).\par \par No change PMHx, SocHx, FHx\par \par

## 2023-07-28 NOTE — DISCUSSION/SUMMARY
[FreeTextEntry1] : Patient with stable seizures associated with developmental delay and is being treated for  ADHD and anxiety with increased Valproate (BRAND - REFILLED BY DR WHITE).  \par \par \par Plan:\par continue current AED regimen\par Doing well with Dr. White at [facility] now seeing him once per month. (prescribed Depakote)\par annual labs forward recent)\par Bone density Dexa scan (long term VPA r/o osteopenia)\par encouraged 3 serings Ca+ in daily diet/low weight bearing exercises\par reviewed seizure triggers\par follow up in one year\par Also with tics - only in day - getting better. Will d/w movement if worsens but nothing to do at this time.\par \par Will f/u in 12 months.\par \par Total time 31 min.\par \par

## 2023-08-03 ENCOUNTER — APPOINTMENT (OUTPATIENT)
Dept: RADIOLOGY | Facility: IMAGING CENTER | Age: 33
End: 2023-08-03
Payer: MEDICARE

## 2023-08-03 ENCOUNTER — OUTPATIENT (OUTPATIENT)
Dept: OUTPATIENT SERVICES | Facility: HOSPITAL | Age: 33
LOS: 1 days | End: 2023-08-03
Payer: MEDICARE

## 2023-08-03 DIAGNOSIS — Z98.890 OTHER SPECIFIED POSTPROCEDURAL STATES: Chronic | ICD-10-CM

## 2023-08-03 DIAGNOSIS — Z86.69 PERSONAL HISTORY OF OTHER DISEASES OF THE NERVOUS SYSTEM AND SENSE ORGANS: Chronic | ICD-10-CM

## 2023-08-03 DIAGNOSIS — Z00.8 ENCOUNTER FOR OTHER GENERAL EXAMINATION: ICD-10-CM

## 2023-08-03 PROCEDURE — 77080 DXA BONE DENSITY AXIAL: CPT

## 2023-08-03 PROCEDURE — 77080 DXA BONE DENSITY AXIAL: CPT | Mod: 26

## 2023-08-13 ENCOUNTER — NON-APPOINTMENT (OUTPATIENT)
Age: 33
End: 2023-08-13

## 2024-07-29 ENCOUNTER — APPOINTMENT (OUTPATIENT)
Dept: NEUROLOGY | Facility: CLINIC | Age: 34
End: 2024-07-29
Payer: MEDICARE

## 2024-07-29 VITALS
HEART RATE: 68 BPM | BODY MASS INDEX: 23.9 KG/M2 | HEIGHT: 64 IN | SYSTOLIC BLOOD PRESSURE: 119 MMHG | DIASTOLIC BLOOD PRESSURE: 78 MMHG | WEIGHT: 140 LBS

## 2024-07-29 PROCEDURE — 99214 OFFICE O/P EST MOD 30 MIN: CPT

## 2024-07-29 PROCEDURE — G2211 COMPLEX E/M VISIT ADD ON: CPT

## 2024-07-29 NOTE — HISTORY OF PRESENT ILLNESS
[FreeTextEntry1] : **UPDATE:7/28/2023*** Patient accompanied by father - father needs renal and liver transplant doing well with no reported seizures mood good  **UPDATE:7/28/2023*** Patient accompanied by father doing well with no reported seizures Zoloft is helping for anxiety mood good  **UPDATE:6/4/2021*** Patient accompanied by father doing well with no reported seizures Zoloft is helping for anxiety mood good  **UPDATE:9/29/2020*** Patient accompanied by father doing well with no reported seizures Zoloft is helping for anxiety mood good   **UPDATE:6/12/19*** Patient accompanied by father doing well with no reported seizures Zoloft is helping for anxiety mood good   Divalproex 750mg daily (prescribed by psychiatry)   The patient is a 28 year old right handed man with history as below.  He has had no seizures since last visit.  He states his mood is good.  He is on Depakote 750mg ER daily with last increase by Dr. White for irritable behavior, and it has helped. He is in psychotherapy.    He has had no hospitalizations, ER visits, surgeries or injuries since last visit.  He has no complaints on complete ROS.  He is sleeping quite a bit but stays up very late, eating well, and mood is good.  He reports no side effects from meds.  HE reports no new illnesses in the family.    Past history: The patient had the onset  of seizures shortly after a camping trip in February 2004 and one on May 16th, 2004 and started on Depakote at that time.  The EEGs have been abnormal with interictal epileptiform patterns. He originally followed with Dr. Schaffer and then with Dr. Rosenberg.  He also has depression, anxiety, ADHD.  He has developmental disability and has always been in special courses.     He was born one of triplets at 26 weeks, 2 pounds 10 Oz at [facility].  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  Brain MRI performed in the past many years ago which was unrevealing.  Continues to do well at this time.  One short seizure in June 2016 with sleep deprivation.  Now, had retinal detachment last month that was repaired and increased anxiety.  Increased to Zoloft 100mg bid.  Still on Depakote (brand name).  No change PMHx, SocHx, FHx

## 2024-07-29 NOTE — DISCUSSION/SUMMARY
[FreeTextEntry1] : Patient with stable seizures associated with developmental delay and is being treated for  ADHD and anxiety with increased Valproate (BRAND - REFILLED BY DR WHITE).     Plan: continue current AED regimen Doing well with Dr. White at [facility] now seeing him once per month. (prescribed Depakote) annual labs forward recent) Bone density Dexa scan (long term VPA r/o osteopenia) - found to have low bone density and to be repeated 2025. If still low, they will need to discuss further with Dr. White and Lauren. encouraged 3 serings Ca+ in daily diet/low weight bearing exercises reviewed seizure triggers follow up in one year  Will f/u in 12 months.  Total time 31 min.

## 2024-09-03 ENCOUNTER — APPOINTMENT (OUTPATIENT)
Dept: NEUROLOGY | Facility: CLINIC | Age: 34
End: 2024-09-03

## 2025-07-31 ENCOUNTER — NON-APPOINTMENT (OUTPATIENT)
Age: 35
End: 2025-07-31

## 2025-08-01 ENCOUNTER — APPOINTMENT (OUTPATIENT)
Dept: NEUROLOGY | Facility: CLINIC | Age: 35
End: 2025-08-01
Payer: MEDICARE

## 2025-08-01 VITALS — HEIGHT: 66 IN | DIASTOLIC BLOOD PRESSURE: 74 MMHG | HEART RATE: 78 BPM | SYSTOLIC BLOOD PRESSURE: 121 MMHG

## 2025-08-01 VITALS — WEIGHT: 150 LBS | BODY MASS INDEX: 24.21 KG/M2

## 2025-08-01 PROCEDURE — 99214 OFFICE O/P EST MOD 30 MIN: CPT

## 2025-08-01 PROCEDURE — G2211 COMPLEX E/M VISIT ADD ON: CPT
